# Patient Record
Sex: FEMALE | Race: WHITE
[De-identification: names, ages, dates, MRNs, and addresses within clinical notes are randomized per-mention and may not be internally consistent; named-entity substitution may affect disease eponyms.]

---

## 2019-02-20 ENCOUNTER — HOSPITAL ENCOUNTER (EMERGENCY)
Dept: HOSPITAL 74 - JER | Age: 34
Discharge: HOME | End: 2019-02-20
Payer: COMMERCIAL

## 2019-02-20 VITALS — HEART RATE: 82 BPM | SYSTOLIC BLOOD PRESSURE: 125 MMHG | TEMPERATURE: 98.2 F | DIASTOLIC BLOOD PRESSURE: 80 MMHG

## 2019-02-20 VITALS — BODY MASS INDEX: 46.6 KG/M2

## 2019-02-20 DIAGNOSIS — J03.90: Primary | ICD-10-CM

## 2019-02-20 LAB
ALBUMIN SERPL-MCNC: 3.5 G/DL (ref 3.4–5)
ALP SERPL-CCNC: 85 U/L (ref 45–117)
ALT SERPL-CCNC: 26 U/L (ref 13–61)
ANION GAP SERPL CALC-SCNC: 6 MMOL/L (ref 8–16)
AST SERPL-CCNC: 15 U/L (ref 15–37)
BASOPHILS # BLD: 0.2 % (ref 0–2)
BILIRUB SERPL-MCNC: 0.3 MG/DL (ref 0.2–1)
BUN SERPL-MCNC: 8 MG/DL (ref 7–18)
CALCIUM SERPL-MCNC: 8.8 MG/DL (ref 8.5–10.1)
CHLORIDE SERPL-SCNC: 101 MMOL/L (ref 98–107)
CO2 SERPL-SCNC: 28 MMOL/L (ref 21–32)
CREAT SERPL-MCNC: 0.9 MG/DL (ref 0.55–1.3)
DEPRECATED RDW RBC AUTO: 15.7 % (ref 11.6–15.6)
EOSINOPHIL # BLD: 0.1 % (ref 0–4.5)
GLUCOSE SERPL-MCNC: 103 MG/DL (ref 74–106)
HCT VFR BLD CALC: 37.8 % (ref 32.4–45.2)
HGB BLD-MCNC: 12.5 GM/DL (ref 10.7–15.3)
LYMPHOCYTES # BLD: 9.1 % (ref 8–40)
MCH RBC QN AUTO: 26.3 PG (ref 25.7–33.7)
MCHC RBC AUTO-ENTMCNC: 33.1 G/DL (ref 32–36)
MCV RBC: 79.6 FL (ref 80–96)
MONOCYTES # BLD AUTO: 7.3 % (ref 3.8–10.2)
NEUTROPHILS # BLD: 83.3 % (ref 42.8–82.8)
PLATELET # BLD AUTO: 238 K/MM3 (ref 134–434)
PMV BLD: 9 FL (ref 7.5–11.1)
POTASSIUM SERPLBLD-SCNC: 4.3 MMOL/L (ref 3.5–5.1)
PROT SERPL-MCNC: 8 G/DL (ref 6.4–8.2)
RBC # BLD AUTO: 4.75 M/MM3 (ref 3.6–5.2)
SODIUM SERPL-SCNC: 135 MMOL/L (ref 136–145)
WBC # BLD AUTO: 17.2 K/MM3 (ref 4–10)

## 2019-02-20 PROCEDURE — 3E03329 INTRODUCTION OF OTHER ANTI-INFECTIVE INTO PERIPHERAL VEIN, PERCUTANEOUS APPROACH: ICD-10-PCS | Performed by: EMERGENCY MEDICINE

## 2019-02-20 PROCEDURE — 3E033GC INTRODUCTION OF OTHER THERAPEUTIC SUBSTANCE INTO PERIPHERAL VEIN, PERCUTANEOUS APPROACH: ICD-10-PCS | Performed by: EMERGENCY MEDICINE

## 2019-02-20 PROCEDURE — 3E0333Z INTRODUCTION OF ANTI-INFLAMMATORY INTO PERIPHERAL VEIN, PERCUTANEOUS APPROACH: ICD-10-PCS | Performed by: EMERGENCY MEDICINE

## 2019-02-20 NOTE — PDOC
History of Present Illness





- General


Chief Complaint: Sore Throat


Stated Complaint: SWOLLEN TONSILS


Time Seen by Provider: 02/20/19 13:47


History Source: Patient


Exam Limitations: No Limitations





- History of Present Illness


Initial Comments: 





02/20/19 13:47


CHIEF COMPLAINT: Throat pain





HISTORY OF PRESENT ILLNESS: This is a 33-year-old female with a history of 

hypertension and previous episodes of tonsillitis requiring IV antibiotics who 

presents with 3 days of throat pain/painful swallowing. She denies fevers/

chills. She is able to swallow liquids but not food. She was seen in her 

primary care provider's office today and sent for IV steroids and antibiotics.





Vital signs on arrival are notable for pulse of 107.





PCP is Dr. Anthony.








REVIEW OF SYSTEMS:


GENERAL/CONSTITUTIONAL: No fever or chills. No weakness. No weight change.


HEAD, EYES, EARS, NOSE AND THROAT: See HPI.


CARDIOVASCULAR: No chest pain or palpitations.


RESPIRATORY: No cough, wheezing, or shortness of breath.


GASTROINTESTINAL: No nausea, vomiting, diarrhea or constipation. 


GENITOURINARY: No dysuria, frequency, or change in urination.


MUSCULOSKELETAL: No joint or muscle swelling or pain. No neck or back pain.


SKIN: No rash or easy bruising.


NEUROLOGIC: No headache, vertigo, loss of consciousness, or loss of sensation.


PSYCHIATRIC: No depression or anxiety.


ENDOCRINE: No increased thirst. No abnormal weight change.


HEMATOLOGIC/LYMPHATIC: No anemia, easy bleeding, or history of blood clots.


ALLERGIC/IMMUNOLOGIC: No hives or skin allergy. No latex allergy.





PHYSICAL EXAM:


GENERAL: The patient is awake, alert, and fully oriented, in no acute distress.


HEAD: Normal with no signs of trauma.


ENT: Kissing tonsils, no exudates. No stridor, drooling, or hot potato voice. 

Cervical adenopathy. 


LUNGS: Clear to auscultation bilaterally. Normal excursion. No respiratory 

distress or use of accessory muscles.


CV: RRR, S1/S2, no MRG. Cap refill < 2 sec.


ABDOMEN: Soft, non-distended, non-tender.


EXTREMITIES: Normal range of motion, no edema.


NEUROLOGICAL: Normal speech, normal gait. CN II-XII grossly intact.


PSYCH: Normal mood, normal affect.


SKIN: Warm, dry, normal turgor, no rashes or lesions noted.


02/20/19 14:40








Past History





- Past Medical History


Allergies/Adverse Reactions: 


 Allergies











Allergy/AdvReac Type Severity Reaction Status Date / Time


 


shellfish derived Allergy Intermediate Vomiting Verified 02/20/19 13:15














- Suicide/Smoking/Psychosocial Hx


Smoking History: Never smoked


Hx Alcohol Use: No


Drug/Substance Use Hx: No





*Physical Exam





- Vital Signs


 Last Vital Signs











Temp Pulse Resp BP Pulse Ox


 


 98.9 F   107 H  20   126/94   97 


 


 02/20/19 13:15  02/20/19 13:15  02/20/19 13:15  02/20/19 13:15  02/20/19 13:15














Moderate Sedation





- Procedure Monitoring


Vital Signs: 


Procedure Monitoring Vital Signs











Temperature  98.9 F   02/20/19 13:15


 


Pulse Rate  107 H  02/20/19 13:15


 


Respiratory Rate  20   02/20/19 13:15


 


Blood Pressure  126/94   02/20/19 13:15


 


O2 Sat by Pulse Oximetry (%)  97   02/20/19 13:15











ED Treatment Course





- LABORATORY


CBC & Chemistry Diagram: 


 02/20/19 14:00





 02/20/19 14:00





Medical Decision Making





- Medical Decision Making





02/20/19 14:42


A/P: 33-year-old female with tonsillitis, severe.





-Labs including CBC, CMP, urine pregnancy


-Toradol 30 mg IV for pain


-Decadron 10 mg IV for tonsillar swelling


-Clindamycin 600 mg IV





Reassess.


02/20/19 16:47


Patient reevaluated and tonsillar edema somewhat improved. Able to tolerate 

liquids. No muffled voice, drooling, or stridor.





Patient offered observation admission, but prefers to continue her treatment 

outpatient. Discussed with covering NP for Dr. Anthony. Will discharge with 

antibiotics, NSAIDs, steroids, and ENT follow-up. Strict return precautions 

reviewed with patient.





*DC/Admit/Observation/Transfer


Diagnosis at time of Disposition: 


 Tonsillitis








- Discharge Dispostion


Disposition: HOME


Condition at time of disposition: Improved


Decision to Admit order: No





- Referrals


Referrals: 


Lo Anthony MD [Primary Care Provider] - 


CousinAnthony MD [Staff Physician] - Call tomorrow





- Patient Instructions


Printed Discharge Instructions:  DI for Pharyngitis/Tonsillopharyngitis -- Adult


Additional Instructions: 


Drink plenty of fluids


Take ibuprofen, prednisone, and clindamycin as prescribed


Follow-up with ENT (referral and close)


Return to the ER immediately if he has difficulty swallowing liquids, 

difficulty breathing, change in your voice, or any other concerning symptoms





- Post Discharge Activity